# Patient Record
Sex: FEMALE | URBAN - METROPOLITAN AREA
[De-identification: names, ages, dates, MRNs, and addresses within clinical notes are randomized per-mention and may not be internally consistent; named-entity substitution may affect disease eponyms.]

---

## 2019-01-01 ENCOUNTER — TELEPHONE (OUTPATIENT)
Dept: NEUROLOGY | Facility: HOSPITAL | Age: 59
End: 2019-01-01

## 2019-07-03 NOTE — TELEPHONE ENCOUNTER
"Received call /referral from Fatemeh Chen RN: patient YOSELIN LIMA seeking resource for neuroendocrine tumor information.    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 Started with back pain, nausea, and burping. Was started on Prilosec, scans ordered which finaly lead to liver bx. Pt. Also had EGD on 4/12/19 which showed ulcerations in stomach for which she continues on prilosec. To date patient has completed 4 cycles of /Carbo and Tacentriq, and XRT to jordon mets for pain. Pt. Also reports having 2 broncheal nodes, 2 liver lesions, bone mets at L1, S3, S3 which she received 5 external beam XRT     Patient to sent records/CD's and aware for need to scheduled GA68, MRI, ECHO, tumor markers. Along with path reread. Pt. Also working with her insurance company to  obtain authorization.  "